# Patient Record
Sex: FEMALE
[De-identification: names, ages, dates, MRNs, and addresses within clinical notes are randomized per-mention and may not be internally consistent; named-entity substitution may affect disease eponyms.]

---

## 2018-01-25 ENCOUNTER — HOSPITAL ENCOUNTER (OUTPATIENT)
Dept: HOSPITAL 5 - SPVIMAG | Age: 83
Discharge: HOME | End: 2018-01-25
Attending: PHYSICAL MEDICINE & REHABILITATION
Payer: MEDICARE

## 2018-01-25 DIAGNOSIS — M19.012: Primary | ICD-10-CM

## 2018-01-25 DIAGNOSIS — M85.812: ICD-10-CM

## 2018-01-25 NOTE — XRAY REPORT
XRAY LEFT SHOULDER THREE VIEWS: 01/25/18 09:25:00





CLINICAL: Left shoulder pain.



FINDINGS: Moderate osteopenia.  Severe osteoarthritis of the 

glenohumeral joint with the large inferior osteophyte, irregularity of 

the glenoid and narrowing of joint space.  No fracture or dislocation.  

Minimal acromioclavicular joint arthritis.  Normal soft tissues.



IMPRESSION: Severe glenohumeral joint osteoarthritis.